# Patient Record
(demographics unavailable — no encounter records)

---

## 2024-12-24 NOTE — PHYSICAL EXAM
[No Acute Distress] : no acute distress [Well Nourished] : well nourished [Well Developed] : well developed [Well-Appearing] : well-appearing [Normal Voice/Communication] : normal voice/communication [Normal Sclera/Conjunctiva] : normal sclera/conjunctiva [PERRL] : pupils equal round and reactive to light [EOMI] : extraocular movements intact [Normal Outer Ear/Nose] : the outer ears and nose were normal in appearance [Normal Oropharynx] : the oropharynx was normal [Normal TMs] : both tympanic membranes were normal [No JVD] : no jugular venous distention [Supple] : supple [No Lymphadenopathy] : no lymphadenopathy [Thyroid Normal, No Nodules] : the thyroid was normal and there were no nodules present [No Respiratory Distress] : no respiratory distress  [Clear to Auscultation] : lungs were clear to auscultation bilaterally [No Accessory Muscle Use] : no accessory muscle use [Normal Rate] : normal rate  [Regular Rhythm] : with a regular rhythm [No Murmur] : no murmur heard [Normal S1, S2] : normal S1 and S2 [No Carotid Bruits] : no carotid bruits [No Abdominal Bruit] : a ~M bruit was not heard ~T in the abdomen [No Varicosities] : no varicosities [Pedal Pulses Present] : the pedal pulses are present [No Edema] : there was no peripheral edema [No Extremity Clubbing/Cyanosis] : no extremity clubbing/cyanosis [No Palpable Aorta] : no palpable aorta [Soft] : abdomen soft [Non Tender] : non-tender [Non-distended] : non-distended [No Masses] : no abdominal mass palpated [No HSM] : no HSM [Normal Bowel Sounds] : normal bowel sounds [No Hernias] : no hernias [Normal Sphincter Tone] : normal sphincter tone [No Mass] : no mass [Urethral Meatus] : meatus normal [Urinary Bladder Findings] : the bladder was normal on palpation [Scrotum] : the scrotum was normal [Prostate Tenderness] : the prostate was not tender [Testes Mass (___cm)] : there were no testicular masses [Normal Posterior Cervical Nodes] : no posterior cervical lymphadenopathy [Normal Anterior Cervical Nodes] : no anterior cervical lymphadenopathy [No CVA Tenderness] : no CVA  tenderness [No Spinal Tenderness] : no spinal tenderness [No Joint Swelling] : no joint swelling [Grossly Normal Strength/Tone] : grossly normal strength/tone [No Rash] : no rash [Normal Gait] : normal gait [Coordination Grossly Intact] : coordination grossly intact [No Focal Deficits] : no focal deficits [Normal Affect] : the affect was normal [Deep Tendon Reflexes (DTR)] : deep tendon reflexes were 2+ and symmetric [Normal Insight/Judgement] : insight and judgment were intact [Stool Occult Blood] : stool negative for occult blood

## 2024-12-24 NOTE — HEALTH RISK ASSESSMENT
[Yes] : Yes [2 - 4 times a month (2 pts)] : 2-4 times a month (2 points) [1 or 2 (0 pts)] : 1 or 2 (0 points) [Never (0 pts)] : Never (0 points) [No] : In the past 12 months have you used drugs other than those required for medical reasons? No [No falls in past year] : Patient reported no falls in the past year [0] : 2) Feeling down, depressed, or hopeless: Not at all (0) [PHQ-2 Negative - No further assessment needed] : PHQ-2 Negative - No further assessment needed [Patient reported colonoscopy was abnormal] : Patient reported colonoscopy was abnormal [HIV test declined] : HIV test declined [None] : None [With Family] : lives with family [Retired] : retired [Graduate School] : graduate school [] :  [Sexually Active] : sexually active [Feels Safe at Home] : Feels safe at home [Fully functional (bathing, dressing, toileting, transferring, walking, feeding)] : Fully functional (bathing, dressing, toileting, transferring, walking, feeding) [Fully functional (using the telephone, shopping, preparing meals, housekeeping, doing laundry, using] : Fully functional and needs no help or supervision to perform IADLs (using the telephone, shopping, preparing meals, housekeeping, doing laundry, using transportation, managing medications and managing finances) [Reports changes in hearing] : Reports changes in hearing [Smoke Detector] : smoke detector [Carbon Monoxide Detector] : carbon monoxide detector [Seat Belt] :  uses seat belt [Sunscreen] : uses sunscreen [Designated Healthcare Proxy] : Designated healthcare proxy [Name: ___] : Health Care Proxy's Name: [unfilled]  [Relationship: ___] : Relationship: [unfilled] [de-identified] : Occasional [Audit-CScore] : 2 [de-identified] : Walks regularly [de-identified] : Breakfast - cereal or eggs.  Lunch - sandwich (turkey, pastrami).  Dinner - pasta, chicken.  Not much red meat.  Some junk food. [de-identified] : August 2021. [[id number]] : 0 [Change in mental status noted] : No change in mental status noted [Language] : denies difficulty with language [Behavior] : denies difficulty with behavior [Handling Complex Tasks] : denies difficulty handling complex tasks [Reasoning] : denies difficulty with reasoning [High Risk Behavior] : no high risk behavior [Reports changes in vision] : Reports no changes in vision [Reports changes in dental health] : Reports no changes in dental health [ColonoscopyDate] : 06/24 [ColonoscopyComments] : 06/24 - 2 adenomas.  Three year follow-up.  4/29/2013 - Erythema in hepatic flexure, no polyps.  (Dr. Jorge Neal).  There were prior polyps. [HepatitisCDate] : 11/25/16 [HepatitisCComments] : Negative [de-identified] : No h/o STDs. [de-identified] : Drives a car. [de-identified] : High frequency.  Followed by ENT (Dr. Muro, [place]). [de-identified] : Bifocals.  Dr. Andres Lew ([facility]).  Saw optometrist in 2024. [de-identified] : Going regularly. [AdvancecareDate] : 12/24/2024 [FreeTextEntry4] : Patient has a Health Care Proxy in 1993 and will revise it.

## 2024-12-24 NOTE — ASSESSMENT
[FreeTextEntry1] : (1) HCM - discussed diet, exercise, weight maintenance. Labs ordered in office as below.  Stool guaiac performed by MD and was negative. Hepatitis C screening was negative 11/25/16. HIV screening offered to patient and patient declined.  Patient was encouraged to get the [id number] COVID-19 booster.  He received the influenza vaccination this season. Tetanus reported by patient 6/1/17. He had Prevnar-13 12/1/2021 and will give Prevnar-20 today.   Patient had previously declined the Shingrix but is willing to reconsider. He will need to get it at a pharmacy due to his insurance. Patient had a screening colonoscopy 2013 (Dr. Jorge Neal), with 5-year follow-up recommended (prior colonoscopy had polyps). Patient advised to return to Dr. Neal's office. Patient sees his Dermatologist (Dr. Ingrid Corey) every 6 months. Patient reports having a written Health Care Proxy and/or Living Will and will forward a copy.  (2) CV - BP good today.  Continue present regimen.  Referral given for general CV risk assessment.  (3)  - patient with minimal symptoms, and prostate mildly enlarged. PSA has been stable in recent years.  (4) Lyme added to labs on patient request due to frequent tick bites when outside in the summer. He has not seen any target lesions and did not have symptoms of Lyme.

## 2024-12-24 NOTE — HISTORY OF PRESENT ILLNESS
[de-identified] : Patient presents for a follow-up annual physical.  Patient is a 68-year-old male with a history of hypertension, BPH, GERD, basal cell carcinomas.  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  Patient fractured 2 left sided ribs August 2021 after a fall while he was mowing his lawn and has recovered.  He had cataract surgery in 2021 and again in 2022.  Patient saw his Dermatologist (Dr. Ingrid Corey) and says he had keratosis, and no skin cancers.  He goes every 6 months.  He had a CT calcium score of 22 in 2023.  Patient's blood pressure has been better on olmesartan-HCT.  He also changed his tamsulosin to every other day due to dry ejaculations and orthostasis.  Patient notes that his sister passed away in June 2021 from complications from renal cell carcinoma.  He also has a brother diagnosed with hypertrophic obstructive cardiomyopathy.  Patient had COVID-19 August 2022.  He got Paxlovid and recovered.  He got the influenza vaccination Oct 2024.  He receievedht [id number] Pfizer COVID-19 vaccine booser Oct 2024.  He had Shingrix #1 at his [facility] pharmacy and will return for the second one.  He received Prevnar-20 in 2023.

## 2025-02-24 NOTE — ASSESSMENT
[FreeTextEntry1] : Patient's BP improved on the current regimen of olmesartan 40 + amlodipine 5 + HCT 12.5.  He is tolerating the medication well.  He can follow-up with me for his annual physical late Dec 2025, and can call for an earlier BP check if he finds it to be elevated at home or at another MD visit.

## 2025-02-24 NOTE — HISTORY OF PRESENT ILLNESS
[de-identified] : Patient presents for BP check.  He had amlodipine 5mg added to his regimen, and he is on a 3-in-1 combination pill (olmesartan 40+amlodipine 5+HCTZ 12.5).  He is tolerating the medication well.  No GI upset or leg swelling.  No dizziness or lightheadedness.  He is checking his BP at home and largely getting 110s-130s/60s-70s.